# Patient Record
Sex: MALE | Race: OTHER | Employment: FULL TIME | URBAN - METROPOLITAN AREA
[De-identification: names, ages, dates, MRNs, and addresses within clinical notes are randomized per-mention and may not be internally consistent; named-entity substitution may affect disease eponyms.]

---

## 2018-01-11 NOTE — LETTER
Cont with PT/OT for gait training and strengthening and restoration of ADL's   Fall precautions   Cont DVT prophylaxis with apixaban   Anticoagulants   Medication Route Frequency    apixaban tablet 2.5 mg Oral BID     1/3/18  Continue therapy to improved functional strength.  Per PT last note still has potential to move to a SBA level.    1/11/18  Seen and participated in therapy sessions, please see therapy notes for details.  Therapy is recommending home health.  Will ask  to visit in home setting to assist with community resources.   Date & Time: 7/25/2020, 11:26 AM  Patient: Charisma Bolivar  Encounter Provider(s):    Mari Toledo PA-C       To Whom It May Concern:    Charisma Bolivar was seen and treated in our department on 7/25/2020.  He is unable to return to work until he has rece

## 2020-07-25 ENCOUNTER — APPOINTMENT (OUTPATIENT)
Dept: GENERAL RADIOLOGY | Age: 50
End: 2020-07-25
Attending: PHYSICIAN ASSISTANT
Payer: COMMERCIAL

## 2020-07-25 ENCOUNTER — HOSPITAL ENCOUNTER (OUTPATIENT)
Age: 50
Discharge: HOME | End: 2020-07-25
Payer: COMMERCIAL

## 2020-07-25 VITALS
HEART RATE: 94 BPM | SYSTOLIC BLOOD PRESSURE: 141 MMHG | TEMPERATURE: 99 F | RESPIRATION RATE: 20 BRPM | BODY MASS INDEX: 41.03 KG/M2 | DIASTOLIC BLOOD PRESSURE: 79 MMHG | HEIGHT: 69 IN | OXYGEN SATURATION: 98 % | WEIGHT: 277 LBS

## 2020-07-25 DIAGNOSIS — J18.9 COMMUNITY ACQUIRED PNEUMONIA, UNSPECIFIED LATERALITY: Primary | ICD-10-CM

## 2020-07-25 PROCEDURE — 99204 OFFICE O/P NEW MOD 45 MIN: CPT

## 2020-07-25 PROCEDURE — 71046 X-RAY EXAM CHEST 2 VIEWS: CPT | Performed by: PHYSICIAN ASSISTANT

## 2020-07-25 RX ORDER — AZITHROMYCIN 250 MG/1
TABLET, FILM COATED ORAL
Qty: 1 PACKAGE | Refills: 0 | Status: SHIPPED | OUTPATIENT
Start: 2020-07-25 | End: 2020-07-30

## 2020-07-25 RX ORDER — AMOXICILLIN AND CLAVULANATE POTASSIUM 875; 125 MG/1; MG/1
1 TABLET, FILM COATED ORAL 2 TIMES DAILY
Qty: 20 TABLET | Refills: 0 | Status: SHIPPED | OUTPATIENT
Start: 2020-07-25 | End: 2020-08-04

## 2020-07-25 NOTE — ED PROVIDER NOTES
Patient Seen in: [facility] In [place]      History   Patient presents with:   Body ache and/or chills  Sore Throat  Cough/URI  Testing    Stated Complaint: COVID TEST    HPI    Alvaro Arevalo is a 18-year-old male who presents today for evaluation well-developed and well-nourished, non-toxic and in no acute distress. Actively wearing a mask. Head: Normocephalic and atraumatic. Cardiovascular: Normal rate, regular rhythm, normal heart sounds and intact distal pulses.       Pulmonary/Chest: Effort he is symptom-free for 3 days, if he is negative, he may return to usual activity with social distancing. He will be started on antibiotics in case the finding on x-ray is in fact pneumonia. He is instructed on supportive treatment.   The patient is encou

## 2020-07-25 NOTE — ED INITIAL ASSESSMENT (HPI)
Complains of chills at night \" feels like Im  burning up\" , headache, mild cough and a mild sore throat since Thursday night. Patient unsure how elevated the fever was.  Patient requesting a Covid test.

## 2020-07-27 LAB — SARS-COV-2 RNA RESP QL NAA+PROBE: NOT DETECTED

## 2020-12-18 PROBLEM — F41.0 PANIC: Status: ACTIVE | Noted: 2020-02-28

## 2020-12-18 PROBLEM — G47.00 INSOMNIA: Status: ACTIVE | Noted: 2020-12-18

## 2020-12-18 PROBLEM — F43.9 STRESS: Status: ACTIVE | Noted: 2020-12-18

## 2020-12-18 PROBLEM — F41.9 ANXIETY DISORDER: Status: ACTIVE | Noted: 2020-12-18

## 2020-12-18 PROBLEM — F31.81 BIPOLAR II DISORDER (HCC): Status: ACTIVE | Noted: 2020-12-18

## 2020-12-18 PROBLEM — T50.905S ADVERSE EFFECT OF UNSPECIFIED DRUGS, MEDICAMENTS AND BIOLOGICAL SUBSTANCES, SEQUELA: Status: ACTIVE | Noted: 2020-12-18

## 2021-01-22 PROBLEM — F41.1 GENERALIZED ANXIETY DISORDER: Status: ACTIVE | Noted: 2020-12-18
